# Patient Record
Sex: MALE | Race: WHITE | NOT HISPANIC OR LATINO | Employment: OTHER | URBAN - METROPOLITAN AREA
[De-identification: names, ages, dates, MRNs, and addresses within clinical notes are randomized per-mention and may not be internally consistent; named-entity substitution may affect disease eponyms.]

---

## 2018-06-13 ENCOUNTER — OFFICE VISIT (OUTPATIENT)
Dept: PRIMARY CARE CLINIC | Facility: CLINIC | Age: 63
End: 2018-06-13
Payer: COMMERCIAL

## 2018-06-13 VITALS
SYSTOLIC BLOOD PRESSURE: 113 MMHG | WEIGHT: 150 LBS | RESPIRATION RATE: 18 BRPM | OXYGEN SATURATION: 98 % | HEART RATE: 79 BPM | DIASTOLIC BLOOD PRESSURE: 75 MMHG | TEMPERATURE: 98 F | BODY MASS INDEX: 21.47 KG/M2 | HEIGHT: 70 IN

## 2018-06-13 DIAGNOSIS — G81.94 LEFT HEMIPARESIS: ICD-10-CM

## 2018-06-13 DIAGNOSIS — Z72.0 TOBACCO USE: ICD-10-CM

## 2018-06-13 DIAGNOSIS — Z87.898 HISTORY OF BRAIN TUMOR: ICD-10-CM

## 2018-06-13 DIAGNOSIS — F10.10 ALCOHOL ABUSE: ICD-10-CM

## 2018-06-13 DIAGNOSIS — R62.7 FAILURE TO THRIVE IN ADULT: ICD-10-CM

## 2018-06-13 DIAGNOSIS — R25.3 TWITCH: Primary | ICD-10-CM

## 2018-06-13 DIAGNOSIS — C34.90 MALIGNANT NEOPLASM OF LUNG, UNSPECIFIED LATERALITY, UNSPECIFIED PART OF LUNG: ICD-10-CM

## 2018-06-13 PROCEDURE — 99214 OFFICE O/P EST MOD 30 MIN: CPT | Mod: S$GLB,,, | Performed by: FAMILY MEDICINE

## 2018-06-13 PROCEDURE — 3008F BODY MASS INDEX DOCD: CPT | Mod: CPTII,S$GLB,, | Performed by: FAMILY MEDICINE

## 2018-06-13 PROCEDURE — 99999 PR PBB SHADOW E&M-NEW PATIENT-LVL IV: CPT | Mod: PBBFAC,,, | Performed by: FAMILY MEDICINE

## 2018-06-13 RX ORDER — DIAZEPAM 5 MG/1
5 TABLET ORAL EVERY 8 HOURS PRN
Qty: 90 TABLET | Refills: 0 | Status: SHIPPED | OUTPATIENT
Start: 2018-06-13 | End: 2018-09-08

## 2018-06-13 RX ORDER — CYCLOBENZAPRINE HCL 10 MG
10 TABLET ORAL 3 TIMES DAILY PRN
Qty: 90 TABLET | Refills: 2 | Status: SHIPPED | OUTPATIENT
Start: 2018-06-13 | End: 2018-06-23

## 2018-06-13 RX ORDER — DEXAMETHASONE 1 MG/1
1 TABLET ORAL EVERY 12 HOURS
COMMUNITY

## 2018-06-13 NOTE — PROGRESS NOTES
Subjective:       Patient ID: Alejandro Cruz is a 63 y.o. male.    Chief Complaint: Establish Care and Spasms    HPI: 63-year-old white male in for establishing care was [facility]--admitted December 30, 2016 was released 3 or 4 days later after surgery--removal of a brain tumor at the base of the brain.. 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  After the surgery had stereotactic radiosurgery--helmet was placed on the head with focal radiation penetrating into the area of the tumor brain.  Also had radiation to the lung and chemotherapy for the lung.  Has to oncologist the brain oncologist Dr Santillan and a lung oncologyDr Hall both at [facility].  Patient was initially seen at [facility] had MRI noting the brain tumor and that was transferred [facility].       All of his current 18 months ago--since that time his had chemotherapy finish last June 2017--then had radiation lung and brain--last whole brain radiation was November 2017.  This year placed on Keytruda other immunotherapy --after the first dose developed severe muscle spasm.  Patient was treated with liquid Valium.  Was given prescription for Her because not sure if it was a seizure but patient did not fill.  Patient used to be an alcoholic has not drank anything in 18 months.       Patient has a constant tremor or twitch left shoulder left arm and left chest wall down to the left hip.  On Monday involving the left leg also but that stopped.  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  Neuropathy.      Today on 29t HBO -- hyperbaric--Dr Morris neurology and hyperbaric    ROS:  Skin: no psoriasis, eczema, skin cancer--had fungus all of her feet buttock but that is resolved   HEENT: No headache, ocular pain, blurred vision, diplopia, epistaxis, hoarseness change in voice, thyroid trouble  Lung: No pneumonia, asthma, Tb, wheezing, SOB,Social smoker  and lung cancer stage IV  see history of present illness   Heart: No chest pain, ankle edema, palpitations, MI, elizabeth murmur, hypertension, hyperlipidemiaOccasionally swelling of the left foot if elevated swelling goes away required no treatment   Abdomen: No nausea, vomiting, diarrhea, constipation, ulcers, hepatitis, gallbladder disease, melena, hematochezia, hematemesis  : no UTI, renal disease, stones  MS: no fractures, O/A, lupus, rheumatoid, gout  Neuro: No dizziness, LOC,+ seizures in the past from alcohol withdrawal about 20 years   No diabetes, no anemia, + anxiety, no depression   , 2 children, disabled, lives with wife ]      Objective:   Physical Exam:  General: Well nourished, well developed, no acute distress+ Thin slightly emaciated slight cushingoid look to the face due to recent treatment with Decadron   Skin: No lesions  HEENT: Eyes PERRLA, EOM intact, nose patent, throat non-erythematous Ears TM clear   NECK: Supple, no bruits, No JVD, no nodes  Lungs: Clear, no rales, rhonchi, wheezing  Heart: Regular rate and rhythm, no murmurs, gallops, or rubs  Abdomen: flat, bowel sounds positive, no tenderness, or organomegaly  MS: Patient up with walker has left-sided weakness which patient has had since tumor removed and had radiation and chemotherapy --but now has a constant twitch involving the left upper trapezius left arm left chest wall left abdominal wall and stops at the hip --this started with treatment with immunosuppressive drugs was given in February had episode of bloody twitch placed on Decadron told by neurologist kg to wean off Decadron patient weaned over a three-week period after Decadron stopped twitch recurred  Neuro: Alert, CN intact, oriented X 3  Extremities: No cyanosis, clubbing, or edema         Assessment:       1. Twitch    2. History of brain tumor    3. Malignant neoplasm of lung, unspecified laterality, unspecified part of lung    4. Failure to thrive (0-17)    5. Alcohol abuse    6. Tobacco  use    7. Left hemiparesis        Plan:       Twitch  -     Ambulatory referral to Neurology    History of brain tumor  -     Ambulatory referral to Neurology    Malignant neoplasm of lung, unspecified laterality, unspecified part of lung  -     Ambulatory referral to Neurology    Failure to thrive (0-17)    Alcohol abuse    Tobacco use    Left hemiparesis    Other orders  -     cyclobenzaprine (FLEXERIL) 10 MG tablet; Take 1 tablet (10 mg total) by mouth 3 (three) times daily as needed for Muscle spasms.  Dispense: 90 tablet; Refill: 2  -     diazePAM (VALIUM) 5 MG tablet; Take 1 tablet (5 mg total) by mouth every 8 (eight) hours as needed for Anxiety.  Dispense: 90 tablet; Refill: 0     Patient given Valium 5 mg every 8 hours when necessary muscle spasm uses Flexeril fails to make the symptoms   Flexeril 10 mg 1 by mouth every 8 hours when necessary muscle spasm  Patient told needs to see a neurologist suggest Dr. Guerrero--I am not sure the source of this twitch--very complex case with removal of a brain tumor with left hemiparesis --- patient with lung cancer stage IV with metastases to the brain non-small cell adenocarcinoma--has had radiation to the brain and lung and chemotherapy--symptoms appeared to occur with immunosuppressive drug added with Decadron patient appears somewhat cushingoid weaned off the Decadron and symptoms recurred  Patient advised this is beyond my area of expertise and needs to be followed by a neurologist or specialists at [facility]  I can take care of minor problems but need copies of all labs I'm sure numerous tests to been done so that I can put those in to the chart  All  lung cancer brain cancer chemotherapy radiation immunosuppressive medication should be handled by the specialist  Patient told if feels it may be a seizure EEG would be appropriate couldn't get done and Dr. Guerrero is office  Patient also may need an EMG study could also get done at Dr. Guerrero

## 2018-09-11 ENCOUNTER — OFFICE VISIT (OUTPATIENT)
Dept: PRIMARY CARE CLINIC | Facility: CLINIC | Age: 63
End: 2018-09-11
Payer: COMMERCIAL

## 2018-09-11 VITALS
BODY MASS INDEX: 20.76 KG/M2 | RESPIRATION RATE: 18 BRPM | SYSTOLIC BLOOD PRESSURE: 103 MMHG | OXYGEN SATURATION: 96 % | DIASTOLIC BLOOD PRESSURE: 73 MMHG | WEIGHT: 145 LBS | HEIGHT: 70 IN | HEART RATE: 80 BPM

## 2018-09-11 DIAGNOSIS — C34.90 MALIGNANT NEOPLASM OF LUNG, UNSPECIFIED LATERALITY, UNSPECIFIED PART OF LUNG: Primary | ICD-10-CM

## 2018-09-11 DIAGNOSIS — R62.7 FAILURE TO THRIVE IN ADULT: ICD-10-CM

## 2018-09-11 DIAGNOSIS — R73.9 HYPERGLYCEMIA: ICD-10-CM

## 2018-09-11 DIAGNOSIS — B35.3 TINEA PEDIS, UNSPECIFIED LATERALITY: ICD-10-CM

## 2018-09-11 DIAGNOSIS — R79.89 ELEVATED LIVER FUNCTION TESTS: ICD-10-CM

## 2018-09-11 DIAGNOSIS — M25.473 ANKLE EDEMA: ICD-10-CM

## 2018-09-11 DIAGNOSIS — B35.6 TINEA CRURIS: ICD-10-CM

## 2018-09-11 DIAGNOSIS — Z87.898 HISTORY OF BRAIN TUMOR: ICD-10-CM

## 2018-09-11 PROCEDURE — 3008F BODY MASS INDEX DOCD: CPT | Mod: CPTII,S$GLB,, | Performed by: FAMILY MEDICINE

## 2018-09-11 PROCEDURE — 99999 PR PBB SHADOW E&M-EST. PATIENT-LVL III: CPT | Mod: PBBFAC,,, | Performed by: FAMILY MEDICINE

## 2018-09-11 PROCEDURE — 99214 OFFICE O/P EST MOD 30 MIN: CPT | Mod: S$GLB,,, | Performed by: FAMILY MEDICINE

## 2018-09-11 RX ORDER — FUROSEMIDE 40 MG/1
TABLET ORAL
Qty: 30 TABLET | Refills: 11 | Status: SHIPPED | OUTPATIENT
Start: 2018-09-11

## 2018-09-11 RX ORDER — POTASSIUM CHLORIDE 750 MG/1
CAPSULE, EXTENDED RELEASE ORAL
Qty: 30 CAPSULE | Refills: 5 | Status: SHIPPED | OUTPATIENT
Start: 2018-09-11

## 2018-09-11 RX ORDER — CLOTRIMAZOLE AND BETAMETHASONE DIPROPIONATE 10; .64 MG/G; MG/G
CREAM TOPICAL 2 TIMES DAILY
Qty: 45 G | Refills: 2 | Status: SHIPPED | OUTPATIENT
Start: 2018-09-11

## 2018-09-11 NOTE — PROGRESS NOTES
Subjective:       Patient ID: Alejandro Cruz is a 63 y.o. male.    Chief Complaint: Hospital Follow Up    HPI:  63-year-old male seen in emergency room--went to the ER because he was unable to eat due to of white material in the mouth patient was told that it was a fungus--and was in his mouth and esophagus.  Patient also had some fungus in the gluteal area and buttocks and feet.  Patient was treated with fluconazole 200 mg and nystatin swish and swallow.  Patient has seen Dr. Gonzales in the past for his funguses       Wants to get hospice care----lung cancer , brain tumor        Lab WBC 13.4 hematocrit 39.3 MCV 1 1 sodium 132 glucose 289   chest x-ray show substantial interval resolution of a large lobulated mid lung zone pulmonary mass density new large retrocardiac periaortic mass density in the left lung    ROS:  Skin: no psoriasis, eczema, skin cancer---history of lesions on the buttocks and ft---ecchymoses on forearms bilat  HEENT: No headache, ocular pain, blurred vision, diplopia, epistaxis, hoarseness change in voice, thyroid trouble--has to blow nose lot to get rid of the sinus strip  Lung: No pneumonia, asthma, Tb, wheezing, SOB,+ lung cancer   Heart: No chest pain, +ankle edema--both legs below the knee, palpitations, MI, elizabeth murmur, hypertension, hyperlipidemia  Abdomen: No nausea, vomiting, diarrhea, constipation, ulcers, hepatitis, gallbladder disease, melena, hematochezia, hematemesis  : no UTI, renal disease, stones Occas hard get to BR   MS: no fractures, O/A, lupus, rheumatoid, gout  Neuro: No dizziness, LOC, seizures   No diabetes, no anemia, no anxiety, no depression     Objective:   Physical Exam:  General:  Patient up in wheelchair--appears somewhat emaciated--week  Skin:  Ecchymoses on the forearms bilaterally--rash in the buttocks on feet   HEENT: Eyes PERRLA, EOM intact, nose patent, throat non-erythematous  --use to subsided, ears TMs clear  NECK: Supple, no bruits, No  JVD, no nodes  Lungs:  Decreased breath sounds left greater than right occasional moist breath sounds no wheezes no rales  Heart: Regular rate and rhythm, no murmurs, gallops, or rubs  Abdomen: flat, bowel sounds positive, no tenderness, or organomegaly  MS:  Sitting up in wheelchair but appears weak tires easily  Neuro: Alert, CN intact, oriented X 3  Extremities: No cyanosis, clubbing, +1-2/4 pending edema         Assessment:       1. Malignant neoplasm of lung, unspecified laterality, unspecified part of lung    2. History of brain tumor    3. Failure to thrive in adult    4. Ankle edema    5. Tinea pedis, unspecified laterality    6. Tinea cruris    7. Elevated liver function tests    8. Hyperglycemia        Plan:       Malignant neoplasm of lung, unspecified laterality, unspecified part of lung    History of brain tumor    Failure to thrive in adult    Ankle edema    Tinea pedis, unspecified laterality    Tinea cruris    Elevated liver function tests    Hyperglycemia      Lotrisone cream apply to feet gluteal area b.i.d. until rash resolved  Primary Care hospice or hospice of choice for lung cancer with mets to the brain elevated liver function test  Patient needs a hospital bed with trapeze--family has shower bench bedside commode wheelchair urinals  Lasix 20 mg Micro-K 10 mg q.a.m. if edema fails to resolve will increase to Lasix 40 mg with Micro-K 10 mg q.d.--May decreased back to 20 mg once edema has resolved  May benefit from multi vitamins B12 glue Glucerna

## 2018-09-26 ENCOUNTER — TELEPHONE (OUTPATIENT)
Dept: PRIMARY CARE CLINIC | Facility: CLINIC | Age: 63
End: 2018-09-26

## 2018-09-26 NOTE — TELEPHONE ENCOUNTER
----- Message from Kiran MAJOR Frivaishali sent at 9/26/2018  8:45 AM CDT -----  Contact: Ellen/[facility]  Ellen called in regarding the attached patient.  Ellen stated she has sent 2 faxed over to office regarding ordering hospice for the patient.  Ellen stated patient has passed away but Ellen still needs the signed order back with a date of 9/12/18.    Ellen's call back number is [phone] or fax number is [phone]

## 2018-09-28 ENCOUNTER — TELEPHONE (OUTPATIENT)
Dept: PRIMARY CARE CLINIC | Facility: CLINIC | Age: 63
End: 2018-09-28

## 2018-09-28 NOTE — TELEPHONE ENCOUNTER
----- Message from Alice Byrd sent at 9/28/2018  4:32 PM CDT -----  Contact:  Ellen/[facility]  Contact: Ellen/[facility]  Ellen called in regarding the attached patient.  Ellen stated she has sent 2 faxed over to office regarding ordering hospice for the patient.  Ellen stated patient has passed away but Ellen still needs the signed order back with a date of 9/12/18.     Ellen's call back number is [phone] or fax number is [phone]